# Patient Record
Sex: MALE | URBAN - METROPOLITAN AREA
[De-identification: names, ages, dates, MRNs, and addresses within clinical notes are randomized per-mention and may not be internally consistent; named-entity substitution may affect disease eponyms.]

---

## 2018-03-08 PROBLEM — Z00.00 ENCOUNTER FOR PREVENTIVE HEALTH EXAMINATION: Status: ACTIVE | Noted: 2018-03-08

## 2018-03-12 ENCOUNTER — OUTPATIENT (OUTPATIENT)
Dept: OUTPATIENT SERVICES | Facility: HOSPITAL | Age: 68
LOS: 1 days | End: 2018-03-12
Payer: MEDICARE

## 2018-03-12 VITALS
RESPIRATION RATE: 14 BRPM | HEIGHT: 72 IN | TEMPERATURE: 99 F | DIASTOLIC BLOOD PRESSURE: 67 MMHG | WEIGHT: 182.98 LBS | HEART RATE: 73 BPM | SYSTOLIC BLOOD PRESSURE: 127 MMHG

## 2018-03-12 DIAGNOSIS — K40.30 UNILATERAL INGUINAL HERNIA, WITH OBSTRUCTION, WITHOUT GANGRENE, NOT SPECIFIED AS RECURRENT: ICD-10-CM

## 2018-03-12 DIAGNOSIS — Z98.890 OTHER SPECIFIED POSTPROCEDURAL STATES: Chronic | ICD-10-CM

## 2018-03-12 DIAGNOSIS — Z90.89 ACQUIRED ABSENCE OF OTHER ORGANS: Chronic | ICD-10-CM

## 2018-03-12 DIAGNOSIS — M67.40 GANGLION, UNSPECIFIED SITE: Chronic | ICD-10-CM

## 2018-03-12 DIAGNOSIS — Z41.9 ENCOUNTER FOR PROCEDURE FOR PURPOSES OTHER THAN REMEDYING HEALTH STATE, UNSPECIFIED: Chronic | ICD-10-CM

## 2018-03-12 DIAGNOSIS — Z01.818 ENCOUNTER FOR OTHER PREPROCEDURAL EXAMINATION: ICD-10-CM

## 2018-03-12 LAB
ALBUMIN SERPL ELPH-MCNC: 4.1 G/DL — SIGNIFICANT CHANGE UP (ref 3.3–5)
ALP SERPL-CCNC: 50 U/L — SIGNIFICANT CHANGE UP (ref 40–120)
ALT FLD-CCNC: 31 U/L — SIGNIFICANT CHANGE UP (ref 12–78)
ANION GAP SERPL CALC-SCNC: 4 MMOL/L — LOW (ref 5–17)
AST SERPL-CCNC: 28 U/L — SIGNIFICANT CHANGE UP (ref 15–37)
BILIRUB SERPL-MCNC: 0.5 MG/DL — SIGNIFICANT CHANGE UP (ref 0.2–1.2)
BUN SERPL-MCNC: 16 MG/DL — SIGNIFICANT CHANGE UP (ref 7–23)
CALCIUM SERPL-MCNC: 8.9 MG/DL — SIGNIFICANT CHANGE UP (ref 8.5–10.1)
CHLORIDE SERPL-SCNC: 107 MMOL/L — SIGNIFICANT CHANGE UP (ref 96–108)
CO2 SERPL-SCNC: 29 MMOL/L — SIGNIFICANT CHANGE UP (ref 22–31)
CREAT SERPL-MCNC: 1.1 MG/DL — SIGNIFICANT CHANGE UP (ref 0.5–1.3)
GLUCOSE SERPL-MCNC: 94 MG/DL — SIGNIFICANT CHANGE UP (ref 70–99)
HCT VFR BLD CALC: 40.3 % — SIGNIFICANT CHANGE UP (ref 39–50)
HGB BLD-MCNC: 14 G/DL — SIGNIFICANT CHANGE UP (ref 13–17)
MCHC RBC-ENTMCNC: 31.6 PG — SIGNIFICANT CHANGE UP (ref 27–34)
MCHC RBC-ENTMCNC: 34.7 GM/DL — SIGNIFICANT CHANGE UP (ref 32–36)
MCV RBC AUTO: 91.1 FL — SIGNIFICANT CHANGE UP (ref 80–100)
PLATELET # BLD AUTO: 213 K/UL — SIGNIFICANT CHANGE UP (ref 150–400)
POTASSIUM SERPL-MCNC: 4.1 MMOL/L — SIGNIFICANT CHANGE UP (ref 3.5–5.3)
POTASSIUM SERPL-SCNC: 4.1 MMOL/L — SIGNIFICANT CHANGE UP (ref 3.5–5.3)
PROT SERPL-MCNC: 7.9 G/DL — SIGNIFICANT CHANGE UP (ref 6–8.3)
RBC # BLD: 4.42 M/UL — SIGNIFICANT CHANGE UP (ref 4.2–5.8)
RBC # FLD: 12 % — SIGNIFICANT CHANGE UP (ref 10.3–14.5)
SODIUM SERPL-SCNC: 140 MMOL/L — SIGNIFICANT CHANGE UP (ref 135–145)
WBC # BLD: 6.3 K/UL — SIGNIFICANT CHANGE UP (ref 3.8–10.5)
WBC # FLD AUTO: 6.3 K/UL — SIGNIFICANT CHANGE UP (ref 3.8–10.5)

## 2018-03-12 PROCEDURE — 93010 ELECTROCARDIOGRAM REPORT: CPT | Mod: NC

## 2018-03-12 PROCEDURE — 80053 COMPREHEN METABOLIC PANEL: CPT

## 2018-03-12 PROCEDURE — 85027 COMPLETE CBC AUTOMATED: CPT

## 2018-03-12 PROCEDURE — 93005 ELECTROCARDIOGRAM TRACING: CPT

## 2018-03-12 PROCEDURE — G0463: CPT

## 2018-03-12 RX ORDER — OMEGA-3 ACID ETHYL ESTERS 1 G
0 CAPSULE ORAL
Qty: 0 | Refills: 0 | COMMUNITY

## 2018-03-12 RX ORDER — COLESEVELAM HYDROCHLORIDE 625 MG/1
0 TABLET, FILM COATED ORAL
Qty: 540 | Refills: 0 | COMMUNITY

## 2018-03-12 RX ORDER — AMLODIPINE BESYLATE AND BENAZEPRIL HYDROCHLORIDE 10; 20 MG/1; MG/1
0 CAPSULE ORAL
Qty: 90 | Refills: 0 | COMMUNITY

## 2018-03-12 NOTE — H&P PST ADULT - FAMILY HISTORY
Father  Still living? No  Family history of Alzheimer's disease, Age at diagnosis: Age Unknown     Mother  Still living? No  Family history of cancer, Age at diagnosis: Age Unknown

## 2018-03-12 NOTE — H&P PST ADULT - HISTORY OF PRESENT ILLNESS
67 year old male PMH HTN, Hypercholesterolemia, Arthritis - presents for PST prior to Repair RIGHT Inguinal Hernia with MESH with Dr Alessandro Donaldson on 3/30/18 - pt notes he initially noted the hernia about 2 months ago while [place] when he noted discomfort to right groin area - pt notes he thought he had pulled a muscle but next day discomfort was still there - pt was seen by PA at PCP's office where he was sent for US which he notes was negative - pt was then sent for exam with Dr Espinal and he was then sent for consult with Dr Donaldson where hernia was diagnosed - following discussions with Dr Donaldson pt is electing for scheduled procedure.

## 2018-03-12 NOTE — H&P PST ADULT - PROBLEM SELECTOR PLAN 1
PST Labs; CBC, CMP, EKG - Medical Clearance scheduled with Dr Guerrero for 3/13/18 - pt instructed to stop Multivitamin, Fish oil, Baby ASA and Green Tea - pre-op instructions as well as pre-op wash instructions given to pt with understanding verbalized

## 2018-03-12 NOTE — H&P PST ADULT - NSANTHOSAYNRD_GEN_A_CORE
No. BENTLEY screening performed.  STOP BANG Legend: 0-2 = LOW Risk; 3-4 = INTERMEDIATE Risk; 5-8 = HIGH Risk

## 2018-03-12 NOTE — H&P PST ADULT - LAST CARDIAC ANGIOGRAM/IMAGING
Had Angiogram at [facility] - Dr Li - NO BLOCKAGES - NO STENTS 4 years ago

## 2018-03-12 NOTE — H&P PST ADULT - PMH
Acid reflux    Arthritis  Hands  Hypercholesterolemia    Hypertension    Microscopic hematuria  Known to Dr Espinal  Torn rotator cuff  RIGHT Shoulder  Unilateral inguinal hernia with obstruction and without gangrene, recurrence not specified

## 2018-03-12 NOTE — H&P PST ADULT - MUSCULOSKELETAL COMMENTS
right hand:  tendon pain on palpitation; right thumb and decreased strength due to pain/arthritis Arthritis in hands

## 2018-03-12 NOTE — H&P PST ADULT - PSH
Elective surgery  Parathyroid Removed 1990  Ganglion cyst  on finger.  H/O colonoscopy    Status post tonsillectomy  1952

## 2018-03-12 NOTE — H&P PST ADULT - ASSESSMENT
67 year old male with unilateral inguinal hernia with obstruction, without gangrene, not specified as recurrent - scheduled for Repair RIGHT inguinal Hernia  with MESH with Dr Alessandro Donaldson on 3/30/18

## 2018-03-29 ENCOUNTER — TRANSCRIPTION ENCOUNTER (OUTPATIENT)
Age: 68
End: 2018-03-29

## 2018-03-30 ENCOUNTER — RESULT REVIEW (OUTPATIENT)
Age: 68
End: 2018-03-30

## 2018-03-30 ENCOUNTER — OUTPATIENT (OUTPATIENT)
Dept: OUTPATIENT SERVICES | Facility: HOSPITAL | Age: 68
LOS: 1 days | Discharge: HOME | End: 2018-03-30
Payer: MEDICARE

## 2018-03-30 VITALS
OXYGEN SATURATION: 99 % | HEIGHT: 72 IN | DIASTOLIC BLOOD PRESSURE: 74 MMHG | SYSTOLIC BLOOD PRESSURE: 134 MMHG | HEART RATE: 72 BPM | TEMPERATURE: 98 F | RESPIRATION RATE: 16 BRPM | WEIGHT: 179.9 LBS

## 2018-03-30 VITALS
OXYGEN SATURATION: 98 % | DIASTOLIC BLOOD PRESSURE: 66 MMHG | HEART RATE: 72 BPM | TEMPERATURE: 98 F | SYSTOLIC BLOOD PRESSURE: 114 MMHG | RESPIRATION RATE: 18 BRPM

## 2018-03-30 DIAGNOSIS — Z01.818 ENCOUNTER FOR OTHER PREPROCEDURAL EXAMINATION: ICD-10-CM

## 2018-03-30 DIAGNOSIS — K40.30 UNILATERAL INGUINAL HERNIA, WITH OBSTRUCTION, WITHOUT GANGRENE, NOT SPECIFIED AS RECURRENT: ICD-10-CM

## 2018-03-30 DIAGNOSIS — Z41.9 ENCOUNTER FOR PROCEDURE FOR PURPOSES OTHER THAN REMEDYING HEALTH STATE, UNSPECIFIED: Chronic | ICD-10-CM

## 2018-03-30 DIAGNOSIS — Z98.890 OTHER SPECIFIED POSTPROCEDURAL STATES: Chronic | ICD-10-CM

## 2018-03-30 DIAGNOSIS — Z90.89 ACQUIRED ABSENCE OF OTHER ORGANS: Chronic | ICD-10-CM

## 2018-03-30 DIAGNOSIS — M67.40 GANGLION, UNSPECIFIED SITE: Chronic | ICD-10-CM

## 2018-03-30 PROCEDURE — C1781: CPT

## 2018-03-30 PROCEDURE — 88304 TISSUE EXAM BY PATHOLOGIST: CPT

## 2018-03-30 PROCEDURE — 49507 PRP I/HERN INIT BLOCK >5 YR: CPT | Mod: RT

## 2018-03-30 PROCEDURE — 88304 TISSUE EXAM BY PATHOLOGIST: CPT | Mod: 26

## 2018-03-30 RX ORDER — CHOLECALCIFEROL (VITAMIN D3) 125 MCG
1 CAPSULE ORAL
Qty: 0 | Refills: 0 | COMMUNITY

## 2018-03-30 RX ORDER — CEFAZOLIN SODIUM 1 G
1000 VIAL (EA) INJECTION ONCE
Qty: 0 | Refills: 0 | Status: COMPLETED | OUTPATIENT
Start: 2018-03-30 | End: 2018-03-30

## 2018-03-30 RX ORDER — SODIUM CHLORIDE 9 MG/ML
1000 INJECTION, SOLUTION INTRAVENOUS
Qty: 0 | Refills: 0 | Status: DISCONTINUED | OUTPATIENT
Start: 2018-03-30 | End: 2018-03-30

## 2018-03-30 RX ORDER — COLESEVELAM HYDROCHLORIDE 625 MG/1
1 TABLET, FILM COATED ORAL
Qty: 0 | Refills: 0 | COMMUNITY

## 2018-03-30 RX ORDER — HYDROMORPHONE HYDROCHLORIDE 2 MG/ML
0.5 INJECTION INTRAMUSCULAR; INTRAVENOUS; SUBCUTANEOUS
Qty: 0 | Refills: 0 | Status: DISCONTINUED | OUTPATIENT
Start: 2018-03-30 | End: 2018-03-30

## 2018-03-30 RX ORDER — MAGNESIUM HYDROXIDE 400 MG/1
0 TABLET, CHEWABLE ORAL
Qty: 0 | Refills: 0 | COMMUNITY

## 2018-03-30 RX ORDER — MULTIVIT-MIN/FERROUS GLUCONATE 9 MG/15 ML
1 LIQUID (ML) ORAL
Qty: 0 | Refills: 0 | COMMUNITY

## 2018-03-30 RX ORDER — OXYCODONE HYDROCHLORIDE 5 MG/1
5 TABLET ORAL EVERY 4 HOURS
Qty: 0 | Refills: 0 | Status: DISCONTINUED | OUTPATIENT
Start: 2018-03-30 | End: 2018-03-30

## 2018-03-30 RX ORDER — ACETAMINOPHEN 500 MG
2 TABLET ORAL
Qty: 0 | Refills: 0 | COMMUNITY

## 2018-03-30 RX ORDER — OMEGA-3 ACID ETHYL ESTERS 1 G
1 CAPSULE ORAL
Qty: 0 | Refills: 0 | COMMUNITY

## 2018-03-30 RX ORDER — AMLODIPINE BESYLATE AND BENAZEPRIL HYDROCHLORIDE 10; 20 MG/1; MG/1
1 CAPSULE ORAL
Qty: 0 | Refills: 0 | COMMUNITY

## 2018-03-30 RX ORDER — ASCORBIC ACID 60 MG
1 TABLET,CHEWABLE ORAL
Qty: 0 | Refills: 0 | COMMUNITY

## 2018-03-30 RX ORDER — ASPIRIN/CALCIUM CARB/MAGNESIUM 324 MG
1 TABLET ORAL
Qty: 0 | Refills: 0 | COMMUNITY

## 2018-03-30 RX ORDER — ONDANSETRON 8 MG/1
4 TABLET, FILM COATED ORAL ONCE
Qty: 0 | Refills: 0 | Status: DISCONTINUED | OUTPATIENT
Start: 2018-03-30 | End: 2018-03-30

## 2018-03-30 RX ORDER — OMEPRAZOLE 10 MG/1
1 CAPSULE, DELAYED RELEASE ORAL
Qty: 0 | Refills: 0 | COMMUNITY

## 2018-03-30 RX ADMIN — SODIUM CHLORIDE 75 MILLILITER(S): 9 INJECTION, SOLUTION INTRAVENOUS at 08:48

## 2018-03-30 NOTE — BRIEF OPERATIVE NOTE - POST-OP DX
Inguinal hernia  03/30/2018  Incarcerated Right Inguinal Hernia with Lipoma Spermatic Cord  Active  Alessandro Donaldson

## 2018-03-30 NOTE — ASU DISCHARGE PLAN (ADULT/PEDIATRIC). - MEDICATION SUMMARY - MEDICATIONS TO TAKE
I will START or STAY ON the medications listed below when I get home from the hospital:    Tylenol 325 mg oral tablet  -- 2 tab(s) by mouth every 4 hours, As Needed  -- Indication: For PAIN    Norco 5 mg-325 mg oral tablet  -- 2 tab(s) by mouth every 6 hours, As Needed -for severe pain MDD:8   -- Caution federal law prohibits the transfer of this drug to any person other  than the person for whom it was prescribed.  May cause drowsiness.  Alcohol may intensify this effect.  Use care when operating dangerous machinery.  This product contains acetaminophen.  Do not use  with any other product containing acetaminophen to prevent possible liver damage.  Using more of this medication than prescribed may cause serious breathing problems.    -- Indication: For PAIN    aspirin 81 mg oral delayed release tablet  -- 1 tab(s) by mouth once a day  -- Indication: For HOME MEDICATION     Milk of Magnesia  -- 30 CC BY MOUTH  FOR CONSTIPATION    -- Indication: For CONSTIPATION     WELCHOL      TAB 625MG  -- 1 tab(s) by mouth once a day  -- Indication: For HOME MEDICATION     AMLOD/BENAZP CAP 2.5-10MG  -- 1 cap(s) by mouth once a day  -- Indication: For HOME MEDICATION     ECONAZOLE    CRE 1%  -- Indication: For HOME MEDICATION     Omega-3 oral capsule  -- 1 cap(s) by mouth once a day  -- Indication: For HOME MEDICATION     omeprazole 20 mg oral delayed release capsule  -- 1 cap(s) by mouth once a day  -- Indication: For HOME MEDICATION     One Daily with Minerals oral tablet  -- 1 tab(s) by mouth once a day  -- Indication: For HOME MEDICATION     Vitamin C 25 mg oral tablet, chewable  -- 1 tab(s) by mouth once a day  -- Indication: For HOME MEDICATION     Vitamin D3 1000 intl units oral tablet  -- 1 tab(s) by mouth once a day  -- Indication: For HOME MEDICATION

## 2018-04-02 LAB — SURGICAL PATHOLOGY FINAL REPORT - CH: SIGNIFICANT CHANGE UP

## 2023-09-26 NOTE — ASU PATIENT PROFILE, ADULT - MEDICATION ADMINISTRATION INFO, PROFILE
Amphetamine-dextroamphetamine ER Pending    Insurance response  Prescription Drug Insurance: [employer]  Notes: Prior authorization submitted - will update provider when decision has been made by insurance.     EPA        
no concerns
No